# Patient Record
Sex: MALE | Race: WHITE | NOT HISPANIC OR LATINO | Employment: FULL TIME | URBAN - METROPOLITAN AREA
[De-identification: names, ages, dates, MRNs, and addresses within clinical notes are randomized per-mention and may not be internally consistent; named-entity substitution may affect disease eponyms.]

---

## 2019-05-17 ENCOUNTER — TELEPHONE (OUTPATIENT)
Dept: FAMILY MEDICINE | Facility: CLINIC | Age: 24
End: 2019-05-17

## 2019-05-17 ENCOUNTER — OFFICE VISIT (OUTPATIENT)
Dept: FAMILY MEDICINE | Facility: OTHER | Age: 24
End: 2019-05-17

## 2019-05-17 VITALS
DIASTOLIC BLOOD PRESSURE: 82 MMHG | BODY MASS INDEX: 23.02 KG/M2 | HEART RATE: 70 BPM | WEIGHT: 199 LBS | HEIGHT: 78 IN | OXYGEN SATURATION: 98 % | TEMPERATURE: 99.5 F | RESPIRATION RATE: 16 BRPM | SYSTOLIC BLOOD PRESSURE: 128 MMHG

## 2019-05-17 DIAGNOSIS — K59.00 CONSTIPATION, UNSPECIFIED CONSTIPATION TYPE: Primary | ICD-10-CM

## 2019-05-17 DIAGNOSIS — K64.4 EXTERNAL HEMORRHOIDS: ICD-10-CM

## 2019-05-17 PROCEDURE — 99213 OFFICE O/P EST LOW 20 MIN: CPT | Performed by: PHYSICIAN ASSISTANT

## 2019-05-17 ASSESSMENT — MIFFLIN-ST. JEOR: SCORE: 2012.97

## 2019-05-17 ASSESSMENT — PAIN SCALES - GENERAL: PAINLEVEL: MILD PAIN (2)

## 2019-05-17 NOTE — PROGRESS NOTES
SUBJECTIVE:   Brian Gray is a 24 year old male who presents to clinic today for the following health issues:      HPI  Constipation and hemorrhoids   Patient using colace and mineral oil    Patient presents today to discuss constipation and hemorrhoids. Patient reports he has not had a bowel movement since Tuesday. He reports eating too many pumpkin seeds and suspects this to be the trigger. He has had trouble with constipation in the past. He has tried using stool softeners and mineral oil without relief. He reports stomach somewhat bloated but not really pain. No nausea or vomiting. He reports dealing with hemorrhoids off and on since 16. This has flared. Tried preparation H with mild improvement.     Additional history: as documented    Reviewed and updated as needed this visit by clinical staff         Reviewed and updated as needed this visit by Provider         Patient Active Problem List   Diagnosis     CARDIOVASCULAR SCREENING; LDL GOAL LESS THAN 160     Rectal bleeding     Past Surgical History:   Procedure Laterality Date     ADENOIDECTOMY  1996     PE TUBES  1995, 1996       Social History     Tobacco Use     Smoking status: Never Smoker     Smokeless tobacco: Never Used   Substance Use Topics     Alcohol use: Yes     Comment: social     Family History   Problem Relation Age of Onset     Heart Disease Father      Psychotic Disorder Father      Breast Cancer Maternal Grandmother      Cardiovascular Paternal Grandfather          Current Outpatient Medications   Medication Sig Dispense Refill     hydrocortisone (ANUSOL-HC) 2.5 % cream Place rectally 2 times daily as needed for hemorrhoids 30 g 1     NO ACTIVE MEDICATIONS        Allergies   Allergen Reactions     Loracarbef      lorabid     BP Readings from Last 3 Encounters:   05/17/19 128/82   10/16/15 130/72   08/13/15 120/74    Wt Readings from Last 3 Encounters:   05/17/19 90.3 kg (199 lb)   10/16/15 86.6 kg (191 lb)   08/13/15 90.4 kg (199 lb  "4.8 oz)         ROS:  Constitutional, HEENT, cardiovascular, pulmonary, gi and gu systems are negative, except as otherwise noted.    OBJECTIVE:     /82   Pulse 70   Temp 99.5  F (37.5  C) (Temporal)   Resp 16   Ht 1.969 m (6' 5.5\")   Wt 90.3 kg (199 lb)   SpO2 98%   BMI 23.29 kg/m    Body mass index is 23.29 kg/m .  GENERAL: healthy, alert and no distress  NECK: no adenopathy, no asymmetry, masses, or scars and thyroid normal to palpation  RESP: lungs clear to auscultation - no rales, rhonchi or wheezes  CV: regular rate and rhythm, normal S1 S2, no S3 or S4, no murmur, click or rub, no peripheral edema and peripheral pulses strong  ABDOMEN: soft, nontender, no hepatosplenomegaly, no masses and bowel sounds normal  SKIN: no suspicious lesions or rashes  PSYCH: mentation appears normal, affect normal/bright    Diagnostic Test Results:  none     ASSESSMENT/PLAN:     1. Constipation, unspecified constipation type  Constipation care handout provided to patient today. Discussed adding miralax and increasing water intake. Discussed concerning symptoms for bowel obstruction that should prompt care in the ER.     2. External hemorrhoids  Cream as needed. Should improve when constipation resolves as well.   - hydrocortisone (ANUSOL-HC) 2.5 % cream; Place rectally 2 times daily as needed for hemorrhoids  Dispense: 30 g; Refill: 1    The patient indicates understanding of these issues and agrees with the plan.    Cynthia Phillips PA-C  [facility]  "

## 2019-05-17 NOTE — PATIENT INSTRUCTIONS
Start Miralax - start with 1 capful today   If you haven't had a bowel movement by tomorrow then do 2 capfuls    Continue stool softener twice daily    Hydrocortisone cream on hemorrhoid as needed

## 2019-05-17 NOTE — TELEPHONE ENCOUNTER
Patient contacted, states that he has a appointment today at 2:20 in [facility].  Declines need to speak with nurse at this time.    Debbie Montes RN

## 2019-05-17 NOTE — TELEPHONE ENCOUNTER
Reason for call:  Patient reporting a symptom    Symptom or request: patient has been having some digestive issues. He wouldn't tell me anything else.     Duration (how long have symptoms been present): 2.5 days    Have you been treated for this before? No    Additional comments: not at this time    Phone Number patient can be reached at:  Other phone number:  [phone]    Best Time:  any    Can we leave a detailed message on this number:  YES    Call taken on 5/17/2019 at 11:38 AM by Marilia Crow

## 2019-05-18 ENCOUNTER — HOSPITAL ENCOUNTER (EMERGENCY)
Facility: CLINIC | Age: 24
Discharge: HOME | End: 2019-05-18
Attending: NURSE PRACTITIONER | Admitting: NURSE PRACTITIONER

## 2019-05-18 VITALS — TEMPERATURE: 97.3 F | DIASTOLIC BLOOD PRESSURE: 96 MMHG | SYSTOLIC BLOOD PRESSURE: 132 MMHG | HEART RATE: 66 BPM

## 2019-05-18 DIAGNOSIS — K64.8 INFLAMED INTERNAL HEMORRHOID: ICD-10-CM

## 2019-05-18 PROCEDURE — 99283 EMERGENCY DEPT VISIT LOW MDM: CPT

## 2019-05-18 PROCEDURE — 99283 EMERGENCY DEPT VISIT LOW MDM: CPT | Mod: Z6 | Performed by: NURSE PRACTITIONER

## 2019-05-18 RX ORDER — TRAMADOL HYDROCHLORIDE 50 MG/1
50 TABLET ORAL EVERY 6 HOURS PRN
Qty: 10 TABLET | Refills: 0 | Status: SHIPPED | OUTPATIENT
Start: 2019-05-18 | End: 2019-05-18

## 2019-05-18 RX ORDER — TRAMADOL HYDROCHLORIDE 50 MG/1
50 TABLET ORAL EVERY 6 HOURS PRN
Qty: 10 TABLET | Refills: 0 | Status: SHIPPED | OUTPATIENT
Start: 2019-05-18

## 2019-05-18 ASSESSMENT — ENCOUNTER SYMPTOMS
FEVER: 0
NAUSEA: 0
RECTAL PAIN: 1
CONSTIPATION: 1
CHILLS: 0
ABDOMINAL DISTENTION: 0

## 2019-05-18 NOTE — DISCHARGE INSTRUCTIONS
Use rectal suppositories for hemorrhoid since they are internal (in rectum)  Use Colace capsule once nightly for constipation issues and stop Miralax.      MagCitrate- put bottle into 32 ounces of Gatorade and drink 1/2 tonight and rest in morning.

## 2019-05-18 NOTE — ED NOTES
Discharge reviewed with patient. Patient is concerned that he may have pumpkin seeds impacted in his rectum. Keely NIÑO updated and back to patients room.

## 2019-05-18 NOTE — ED PROVIDER NOTES
History     Chief Complaint   Patient presents with     Constipation     Hemorrhoids     HPI  Brian Gray is a 24 year old male who presents to the ER with complaint of rectal pain and possibly impacted pumpkin seeds in rectum. Reports he was seen by his PCP yesterday and did not have formal rectal exam and was prescribed Hydrocortisone cream. He reports he went to the store and picked up preparation H which did help somewhat with the pain.  He has been using Jana lax for assistance with defecation and has been having looser stools x3 days.  He denies fever, external rectal lesions, blood in stool.     PCP; Justice Aleman     Allergies:  Allergies   Allergen Reactions     Loracarbef      lorabid       Problem List:    Patient Active Problem List    Diagnosis Date Noted     Rectal bleeding 08/13/2015     Priority: Medium     CARDIOVASCULAR SCREENING; LDL GOAL LESS THAN 160 06/04/2013     Priority: Medium        Past Medical History:    No past medical history on file.    Past Surgical History:    Past Surgical History:   Procedure Laterality Date     ADENOIDECTOMY  1996     PE TUBES  1995, 1996       Family History:    Family History   Problem Relation Age of Onset     Heart Disease Father      Psychotic Disorder Father      Breast Cancer Maternal Grandmother      Cardiovascular Paternal Grandfather        Social History:  Marital Status:  Single [1]  Social History     Tobacco Use     Smoking status: Never Smoker     Smokeless tobacco: Never Used   Substance Use Topics     Alcohol use: Yes     Comment: social     Drug use: No        Medications:      traMADol (ULTRAM) 50 MG tablet   hydrocortisone (ANUSOL-HC) 2.5 % cream   NO ACTIVE MEDICATIONS         Review of Systems   Constitutional: Negative for chills and fever.   Gastrointestinal: Positive for constipation and rectal pain. Negative for abdominal distention and nausea.       Physical Exam   BP: (!) 132/96  Pulse: 66  Temp: 97.3  F (36.3   C)      Physical Exam   Constitutional: He appears well-developed and well-nourished. No distress.   HENT:   Head: Normocephalic and atraumatic.   Genitourinary: Rectal exam shows internal hemorrhoid and tenderness. Rectal exam shows no external hemorrhoid, no fissure, no mass and anal tone normal.   Genitourinary Comments: There is a small hemorrhoid palpated at 6'oclock position internally.  Noted soft stool in rectal vault- no overt impaction.    Skin: Skin is warm and dry. He is not diaphoretic.   Nursing note and vitals reviewed.      ED Course  Discussed with patient internal hemorrhoid treatment. He is to use rectal suppositories for internal hemorrhoids and if not improving he may be referred to General Surgery.  Patient reports he would like an enema to disimpact suspect pumpkin seeds. I discussed with him home fleets enema vs having Soap Suds enema here and patient requests enema here.     1822: patient had brown water but had flushed toilet prior to me seeing it. Recommend for him to drink MagCitrate 1/2 bottle tonight and 1/2 in morning. He is to follow up with PCP earlier next week. Instructed regarding ultram use for pain in addition to tylenol and motrin.         Procedures               Critical Care time:  none               No results found for this or any previous visit (from the past 24 hour(s)).    Medications - No data to display    Assessments & Plan (with Medical Decision Making)     I have reviewed the nursing notes.    I have reviewed the findings, diagnosis, plan and need for follow up with the patient.          Medication List      Started    traMADol 50 MG tablet  Commonly known as:  ULTRAM  50 mg, Oral, EVERY 6 HOURS PRN            Final diagnoses:   Inflamed internal hemorrhoid       5/18/2019   [facility] EMERGENCY DEPARTMENT     Keely Vance, APRN CNP  05/18/19 [id number]

## 2019-05-18 NOTE — ED AVS SNAPSHOT
[facility] Emergency Department  [address] [place] [address]  Phone:  [phone]  Fax:  [phone]                                    Brian Gray   MRN: [id number]    Department:  [facility] Emergency Department   Date of Visit:  5/18/2019           After Visit Summary Signature Page    I have received my discharge instructions, and my questions have been answered. I have discussed any challenges I see with this plan with the nurse or doctor.    ..........................................................................................................................................  Patient/Patient Representative Signature      ..........................................................................................................................................  Patient Representative Print Name and Relationship to Patient    ..................................................               ................................................  Date                                   Time    ..........................................................................................................................................  Reviewed by Signature/Title    ...................................................              ..............................................  Date                                               Time          22EPIC Rev 08/18

## 2019-09-27 ENCOUNTER — HEALTH MAINTENANCE LETTER (OUTPATIENT)
Age: 24
End: 2019-09-27

## 2021-01-09 ENCOUNTER — HEALTH MAINTENANCE LETTER (OUTPATIENT)
Age: 26
End: 2021-01-09

## 2021-10-23 ENCOUNTER — HEALTH MAINTENANCE LETTER (OUTPATIENT)
Age: 26
End: 2021-10-23

## 2022-02-12 ENCOUNTER — HEALTH MAINTENANCE LETTER (OUTPATIENT)
Age: 27
End: 2022-02-12

## 2022-10-09 ENCOUNTER — HEALTH MAINTENANCE LETTER (OUTPATIENT)
Age: 27
End: 2022-10-09

## 2022-11-07 ENCOUNTER — OFFICE VISIT (OUTPATIENT)
Dept: FAMILY MEDICINE | Facility: OTHER | Age: 27
End: 2022-11-07
Payer: COMMERCIAL

## 2022-11-07 VITALS
WEIGHT: 195 LBS | TEMPERATURE: 98.5 F | SYSTOLIC BLOOD PRESSURE: 118 MMHG | OXYGEN SATURATION: 98 % | RESPIRATION RATE: 15 BRPM | DIASTOLIC BLOOD PRESSURE: 76 MMHG | BODY MASS INDEX: 23.02 KG/M2 | HEIGHT: 77 IN | HEART RATE: 69 BPM

## 2022-11-07 DIAGNOSIS — H66.92 LEFT ACUTE OTITIS MEDIA: Primary | ICD-10-CM

## 2022-11-07 PROCEDURE — 99213 OFFICE O/P EST LOW 20 MIN: CPT | Performed by: FAMILY MEDICINE

## 2022-11-07 RX ORDER — AMOXICILLIN 500 MG/1
500 CAPSULE ORAL 2 TIMES DAILY
Qty: 20 CAPSULE | Refills: 0 | Status: SHIPPED | OUTPATIENT
Start: 2022-11-07 | End: 2022-11-17

## 2022-11-07 ASSESSMENT — PAIN SCALES - GENERAL: PAINLEVEL: MILD PAIN (2)

## 2022-11-07 NOTE — PROGRESS NOTES
Assessment & Plan       ICD-10-CM    1. Left acute otitis media  H66.92 amoxicillin (AMOXIL) 500 MG capsule        Had allergy to loracarbef, but only 10% cross over to amoxicillin, so still will start with this. Tylenol /ibuprofen can also be used for symptomatic management.      8 minutes spent on the date of the [id number] doing chart review, history and exam, documentation and further activities per the note           No follow-ups on file.    Nelida Gastelum MD, MD  [facility] GOPAL Torres is a 27 year old, presenting for the following health issues:  Ear Problem      History of Present Illness       Reason for visit:  Ear infection. Swollen throat.  Symptom onset:  1-2 weeks ago  Symptoms include:  Muffled hearing, cough, bed sweats, runny nose  Symptom intensity:  Moderate  Symptom progression:  Staying the same  Had these symptoms before:  No  What makes it worse:  Bumpy car rides, getting up in the morning  What makes it better:  Exercise and rest    He eats 2-3 servings of fruits and vegetables daily.He consumes 0 sweetened beverage(s) daily.He exercises with enough effort to increase his heart rate 30 to 60 minutes per day.  He exercises with enough effort to increase his heart rate 4 days per week.   He is taking medications regularly.       Acute Illness  Acute illness concerns: ear infection  Onset/Duration: about 10 days ago  Symptoms:  Fever: No  Chills/Sweats: YES  Headache (location?): No  Sinus Pressure: No  Conjunctivitis:  No  Ear Pain: YES: bilateral  Rhinorrhea: YES  Congestion: YES  Sore Throat: YES  Cough: YES-productive of clear sputum, productive of yellow sputum  Wheeze: No  Decreased Appetite: YES  Nausea: YES  Vomiting: No  Diarrhea: No  Dysuria/Freq.: No  Dysuria or Hematuria: No  Fatigue/Achiness: YES  Sick/Strep Exposure: No  Therapies tried and outcome: ear wax remover, ibuprofen      Review of Systems   Constitutional, HEENT, cardiovascular, pulmonary,  "GI, , musculoskeletal, neuro, skin, endocrine and psych systems are negative, except as otherwise noted.      Objective    /76 (Cuff Size: Adult Large)   Pulse 69   Temp 98.5  F (36.9  C) (Oral)   Resp 15   Ht 1.944 m (6' 4.54\")   Wt 88.5 kg (195 lb)   SpO2 98%   BMI 23.41 kg/m    Body mass index is 23.41 kg/m .  Physical Exam   GENERAL: healthy, alert and no distress  EYES: Eyes grossly normal to inspection, PERRL and conjunctivae and sclerae normal  HENT: ear canals and TM's normal on R, L TM is red and bulging, nose with congestion and mouth without ulcers or lesions  NECK: no adenopathy, no asymmetry, masses, or scars and thyroid normal to palpation  RESP: lungs clear to auscultation - no rales, rhonchi or wheezes  CV: regular rate and rhythm, normal S1 S2, no S3 or S4, no murmur, click or rub, no peripheral edema and peripheral pulses strong  ABDOMEN: soft, nontender, no hepatosplenomegaly, no masses and bowel sounds normal  MS: no gross musculoskeletal defects noted, no edema  SKIN: no suspicious lesions or rashes  NEURO: Normal strength and tone, mentation intact and speech normal  PSYCH: mentation appears normal, affect normal/bright                    "

## 2023-03-25 ENCOUNTER — HEALTH MAINTENANCE LETTER (OUTPATIENT)
Age: 28
End: 2023-03-25

## 2024-08-03 ENCOUNTER — HEALTH MAINTENANCE LETTER (OUTPATIENT)
Age: 29
End: 2024-08-03

## 2025-08-16 ENCOUNTER — HEALTH MAINTENANCE LETTER (OUTPATIENT)
Age: 30
End: 2025-08-16